# Patient Record
Sex: MALE | Race: WHITE | NOT HISPANIC OR LATINO | URBAN - METROPOLITAN AREA
[De-identification: names, ages, dates, MRNs, and addresses within clinical notes are randomized per-mention and may not be internally consistent; named-entity substitution may affect disease eponyms.]

---

## 2019-01-29 PROBLEM — Z00.00 ENCOUNTER FOR PREVENTIVE HEALTH EXAMINATION: Status: ACTIVE | Noted: 2019-01-29

## 2019-02-13 ENCOUNTER — OUTPATIENT (OUTPATIENT)
Dept: OUTPATIENT SERVICES | Facility: HOSPITAL | Age: 69
LOS: 1 days | End: 2019-02-13

## 2019-02-13 ENCOUNTER — APPOINTMENT (OUTPATIENT)
Age: 69
End: 2019-02-13

## 2019-02-14 DIAGNOSIS — H17.9 UNSPECIFIED CORNEAL SCAR AND OPACITY: ICD-10-CM

## 2019-04-10 ENCOUNTER — APPOINTMENT (OUTPATIENT)
Dept: OPHTHALMOLOGY | Facility: CLINIC | Age: 69
End: 2019-04-10

## 2019-07-10 ENCOUNTER — APPOINTMENT (OUTPATIENT)
Dept: OPHTHALMOLOGY | Facility: CLINIC | Age: 69
End: 2019-07-10

## 2019-07-10 ENCOUNTER — OUTPATIENT (OUTPATIENT)
Dept: OUTPATIENT SERVICES | Facility: HOSPITAL | Age: 69
LOS: 1 days | End: 2019-07-10

## 2019-07-11 DIAGNOSIS — H18.459 NODULAR CORNEAL DEGENERATION, UNSPECIFIED EYE: ICD-10-CM

## 2019-07-11 DIAGNOSIS — H17.9 UNSPECIFIED CORNEAL SCAR AND OPACITY: ICD-10-CM

## 2019-10-24 ENCOUNTER — OUTPATIENT (OUTPATIENT)
Dept: OUTPATIENT SERVICES | Facility: HOSPITAL | Age: 69
LOS: 1 days | Discharge: HOME | End: 2019-10-24

## 2019-10-24 VITALS
DIASTOLIC BLOOD PRESSURE: 74 MMHG | RESPIRATION RATE: 18 BRPM | SYSTOLIC BLOOD PRESSURE: 138 MMHG | OXYGEN SATURATION: 98 %

## 2019-10-24 VITALS
OXYGEN SATURATION: 98 % | DIASTOLIC BLOOD PRESSURE: 96 MMHG | SYSTOLIC BLOOD PRESSURE: 158 MMHG | TEMPERATURE: 96 F | RESPIRATION RATE: 18 BRPM | HEIGHT: 73 IN | HEART RATE: 58 BPM | WEIGHT: 242.07 LBS

## 2019-10-24 DIAGNOSIS — Z98.890 OTHER SPECIFIED POSTPROCEDURAL STATES: Chronic | ICD-10-CM

## 2019-10-24 LAB — GLUCOSE BLDC GLUCOMTR-MCNC: 119 MG/DL — HIGH (ref 70–99)

## 2019-10-24 RX ORDER — ONDANSETRON 8 MG/1
4 TABLET, FILM COATED ORAL ONCE
Refills: 0 | Status: DISCONTINUED | OUTPATIENT
Start: 2019-10-24 | End: 2019-11-09

## 2019-10-24 RX ORDER — ACETAMINOPHEN 500 MG
650 TABLET ORAL ONCE
Refills: 0 | Status: DISCONTINUED | OUTPATIENT
Start: 2019-10-24 | End: 2019-11-09

## 2019-10-24 NOTE — PRE-ANESTHESIA EVALUATION ADULT - NSANTHOSAYNRD_GEN_A_CORE
No. ELIDIA screening performed.  STOP BANG Legend: 0-2 = LOW Risk; 3-4 = INTERMEDIATE Risk; 5-8 = HIGH Risk

## 2019-10-29 DIAGNOSIS — H25.12 AGE-RELATED NUCLEAR CATARACT, LEFT EYE: ICD-10-CM

## 2019-10-29 DIAGNOSIS — I25.10 ATHEROSCLEROTIC HEART DISEASE OF NATIVE CORONARY ARTERY WITHOUT ANGINA PECTORIS: ICD-10-CM

## 2019-10-29 DIAGNOSIS — E78.5 HYPERLIPIDEMIA, UNSPECIFIED: ICD-10-CM

## 2019-10-29 DIAGNOSIS — E66.9 OBESITY, UNSPECIFIED: ICD-10-CM

## 2019-10-29 DIAGNOSIS — E11.9 TYPE 2 DIABETES MELLITUS WITHOUT COMPLICATIONS: ICD-10-CM

## 2019-11-05 PROBLEM — Z84.89 FAMILY HISTORY OF OTHER SPECIFIED CONDITIONS: Chronic | Status: ACTIVE | Noted: 2019-10-24

## 2019-11-14 ENCOUNTER — OUTPATIENT (OUTPATIENT)
Dept: OUTPATIENT SERVICES | Facility: HOSPITAL | Age: 69
LOS: 1 days | Discharge: HOME | End: 2019-11-14

## 2019-11-14 VITALS
SYSTOLIC BLOOD PRESSURE: 147 MMHG | HEART RATE: 56 BPM | OXYGEN SATURATION: 97 % | RESPIRATION RATE: 16 BRPM | DIASTOLIC BLOOD PRESSURE: 86 MMHG

## 2019-11-14 VITALS
TEMPERATURE: 97 F | SYSTOLIC BLOOD PRESSURE: 164 MMHG | OXYGEN SATURATION: 96 % | WEIGHT: 240.08 LBS | HEART RATE: 56 BPM | HEIGHT: 71 IN | RESPIRATION RATE: 15 BRPM | DIASTOLIC BLOOD PRESSURE: 94 MMHG

## 2019-11-14 DIAGNOSIS — Z98.890 OTHER SPECIFIED POSTPROCEDURAL STATES: Chronic | ICD-10-CM

## 2019-11-14 DIAGNOSIS — H26.9 UNSPECIFIED CATARACT: Chronic | ICD-10-CM

## 2019-11-14 LAB — GLUCOSE BLDC GLUCOMTR-MCNC: 121 MG/DL — HIGH (ref 70–99)

## 2019-11-14 RX ORDER — IRBESARTAN 75 MG/1
0 TABLET ORAL
Qty: 0 | Refills: 0 | DISCHARGE

## 2019-11-14 RX ORDER — ACETAMINOPHEN 500 MG
650 TABLET ORAL ONCE
Refills: 0 | Status: DISCONTINUED | OUTPATIENT
Start: 2019-11-14 | End: 2019-11-30

## 2019-11-14 RX ORDER — ONDANSETRON 8 MG/1
4 TABLET, FILM COATED ORAL ONCE
Refills: 0 | Status: DISCONTINUED | OUTPATIENT
Start: 2019-11-14 | End: 2019-11-30

## 2019-11-14 RX ORDER — METFORMIN HYDROCHLORIDE 850 MG/1
2 TABLET ORAL
Qty: 0 | Refills: 0 | DISCHARGE

## 2019-11-14 RX ORDER — ROSUVASTATIN CALCIUM 5 MG/1
1 TABLET ORAL
Qty: 0 | Refills: 0 | DISCHARGE

## 2019-11-14 NOTE — ASU PATIENT PROFILE, ADULT - PMH
Diabetes    Family history of medical problems  cad, dm, htn, hypercholesteremia  H/O: hypertension    High cholesterol

## 2019-11-14 NOTE — ASU DISCHARGE PLAN (ADULT/PEDIATRIC) - PATIENT EDUCATION MATERIALS PROVIED
Other (specify)/Provider pre-printed instructions given/WRITTEN INSTRUCTIONS RREVIEWED, QUESTIONS ANSWERED, ALL UNDERSTOOD.  TODAYS EYE DROP SCHEDULEW REVIEWED , UNDERSTOOD. FOLLOWUP TO PROVIDER INSTRUCTED. APPOINTMENT .

## 2019-11-25 DIAGNOSIS — E66.9 OBESITY, UNSPECIFIED: ICD-10-CM

## 2019-11-25 DIAGNOSIS — I10 ESSENTIAL (PRIMARY) HYPERTENSION: ICD-10-CM

## 2019-11-25 DIAGNOSIS — E11.9 TYPE 2 DIABETES MELLITUS WITHOUT COMPLICATIONS: ICD-10-CM

## 2019-11-25 DIAGNOSIS — H25.11 AGE-RELATED NUCLEAR CATARACT, RIGHT EYE: ICD-10-CM

## 2019-11-25 DIAGNOSIS — I25.10 ATHEROSCLEROTIC HEART DISEASE OF NATIVE CORONARY ARTERY WITHOUT ANGINA PECTORIS: ICD-10-CM

## 2019-11-25 DIAGNOSIS — E78.00 PURE HYPERCHOLESTEROLEMIA, UNSPECIFIED: ICD-10-CM

## 2019-12-08 NOTE — PRE-ANESTHESIA EVALUATION ADULT - NSANTHSUBSTSD_GEN_ALL_CORE
Review of Systems/Medical History          Cardiovascular  Hyperlipidemia, Hypertension ,    Pulmonary  Sleep apnea ,        GI/Hepatic    GERD ,  Hiatal hernia,             Endo/Other  Diabetes well controlled type 2 Oral agent,      GYN       Hematology  Anemia ,     Musculoskeletal       Neurology   Psychology                Anesthesia Plan  ASA Score- 2     Anesthesia Type- IV sedation with anesthesia with ASA Monitors  Additional Monitors:   Airway Plan:         Plan Factors-    Induction-     Postoperative Plan-     Informed Consent- Anesthetic plan and risks discussed with patient  I personally reviewed this patient with the CRNA  Discussed and agreed on the Anesthesia Plan with the CRNA  Kalyani Lara             No results found for: HGBA1C    Lab Results   Component Value Date    K 4 0 02/08/2018     02/08/2018    CO2 29 02/08/2018    BUN 19 02/08/2018    CREATININE 1 06 02/08/2018    GLUF 118 (H) 02/08/2018    CALCIUM 9 3 02/08/2018    EGFR 66 02/08/2018       No results found for: WBC, HGB, HCT, MCV, PLT
Review of Systems/Medical History  Patient summary reviewed  Chart reviewed  No history of anesthetic complications     Cardiovascular  Negative cardio ROS Exercise tolerance (METS): >4,  Hyperlipidemia, Hypertension controlled,    Pulmonary  Smoker cigarette smoker  Cumulative Pack Years: 20, Sleep apnea ,        GI/Hepatic    GERD well controlled, Esophageal disease medina esophagus,  Hiatal hernia,        Negative  ROS Prostatic disorder, benign prostatic hyperplasia       Endo/Other  Diabetes well controlled type 2 Oral agent,      GYN       Hematology  Anemia ,     Musculoskeletal    Arthritis     Neurology  Negative neurology ROS      Psychology   Negative psychology ROS              Physical Exam    Airway       Dental   upper dentures and lower dentures,     Cardiovascular  Comment: Negative ROS, Cardiovascular exam normal    Pulmonary  Pulmonary exam normal     Other Findings  Pt states he has worn his dentures for EGDs in the past  Pt understands complications      Anesthesia Plan  ASA Score- 3     Anesthesia Type- IV sedation with anesthesia with ASA Monitors  Additional Monitors:   Airway Plan:         Plan Factors-Patient not instructed to abstain from smoking on day of procedure  Patient did not smoke on day of surgery  Induction- intravenous  Postoperative Plan-     Informed Consent- Anesthetic plan and risks discussed with patient  I personally reviewed this patient with the CRNA  Discussed and agreed on the Anesthesia Plan with the CRNA  Masood Becker
No

## 2021-01-08 NOTE — ASU DISCHARGE PLAN (ADULT/PEDIATRIC) - "IF YOU OR YOUR GUARDIAN/FAMILY IS A SMOKER, IT IS IMPORTANT FOR YOUR HEALTH TO STOP SMOKING. PLEASE BE AWARE THAT SECOND HAND SMOKE IS ALSO HARMFUL."
GallbladderSurgery  Post-operative Instructions  - Dr Parker-    Anesthesia  1. General precautions you should follow during the 24 hours after receiving any anesthesia (general, epidural, spinal, local with sedation, or nerve block):  2. Rest today but not necessarily in bed.  You may experience dizziness, drowsiness, or light-headedness. Do not drive a motor vehicle, drink alcohol, operate machinery, or make any major decisions for the next 24 hours.   3. During this time, a responsible adult needs to oversee your care for at least the next 6 hours.  4. Please continue to work of deep breathing exercised of 10 large breaths every few hours to assist with lung expansion    Diet  1. Wait until nausea resolves before beginning to drink and eat. Start with small sips of water.  2. You can resume clear liquids initially and start with a light, simple meal. Afterwards you can resume a normal diet.  3. A sore throat may persist for several day. Cold liquids along with ice chips and throat lozenges can help relieve the soreness.  4. Drink plenty of fluid for several days following surgery  5. To assist with constipation, eat fruits and vegetables. May take over-the-counter laxative, such as Miralax or Milk of Magnesia, as directed if constipated. I would also suggest a glass of prune juice daily.    Activity  1. No lifting > 15 lbs for 2 weeks from the date of surgery  2. No driving for 3 days after surgery. Can resume driving when off of all narcotic pain medications and pain will not limit your ability to react    Pain/Swelling  1. Take over the counter Tylenol and Ibuprofen for pain control  a. Tylenol max dosage of 500-1000mg every 6 hours  b. Ibuprofen max dosage of 600mg every 6 hours (alternate with Tylenol to allow every 3 hours dosing)  2. Utilize ice packs to the area of pain and swelling every hour as needed  a. This will decrease swelling and minimize pain  3. A narcotic may be prescribed such as  Norco  a. Take as directed but usually 1 tab every 6 hours as needed for pain in addition to what is described above    Wound  1. The incisions are sewn up with dissolvable sutures and will not need to be removed  2. Incisions covered with dermabond. No need to cover.  3. Can shower 1/8  4. No bathing or swimming for 2 weeks    Home Medications  1. You can resume all home medications  2. Anticoagulate medications can be resumed 24 hours after surgery unless instructed otherwise    Call the office if any of the following  1. Excessive bleeding or drainage, but some bleeding and drainage is expected  2. Fever > 101  3. Increasing swelling or redness  4. Yellowing of the eyes or skin  5. Nausea and vomiting lasting for more than 24 hours  6. Trouble breathing or chest pain  7. Inability to urinate for more than 8 hours. Please proceed to the nearest urgent care center for evaluation and possible ramos catheter placement  8. IF YOUR ARE EXPERIENCING A MEDICAL EMERGENCY, PLEASE GO TO YOUR NEAREST EMERGENCY DEPARTMENT     Follow Up  1. Please make an appointment to see Dr Parker in the office in approximately 2 weeks  [facility] [place] Office: [phone]      Statement Selected

## 2021-07-03 NOTE — PRE-ANESTHESIA EVALUATION ADULT - NSANTHASARD_GEN_ALL_CORE
Addendum Note by Elizabeth Heredia RN at 2/21/2017  5:24 PM     Author: Elizabeth Heredia RN Service: -- Author Type: Registered Nurse    Filed: 2/21/2017  5:24 PM Encounter Date: 2/21/2017 Status: Signed    : Elizabeth Heredia RN (Registered Nurse)    Addended by: ELIZABETH HEREDIA on: 2/21/2017 05:24 PM        Modules accepted: Orders        
3